# Patient Record
(demographics unavailable — no encounter records)

---

## 2024-11-25 NOTE — PLAN
[FreeTextEntry1] : I performed a breast exam and renewed her medications. no gynecological complaints

## 2024-11-25 NOTE — HISTORY OF PRESENT ILLNESS
[FreeTextEntry1] : Patient in office for annual exam. patient on hydrocortisone 25% rectal suppositories and Macrobid 100 mg  for postcoital prophylaxis. Patient does not want a pelvic exam and does not do mammogram or colonoscopy